# Patient Record
Sex: MALE | NOT HISPANIC OR LATINO | Employment: FULL TIME | URBAN - METROPOLITAN AREA
[De-identification: names, ages, dates, MRNs, and addresses within clinical notes are randomized per-mention and may not be internally consistent; named-entity substitution may affect disease eponyms.]

---

## 2018-05-09 ENCOUNTER — OFFICE VISIT (OUTPATIENT)
Dept: ORTHOPEDIC SURGERY | Facility: CLINIC | Age: 53
End: 2018-05-09

## 2018-05-09 VITALS — BODY MASS INDEX: 34.86 KG/M2 | WEIGHT: 230 LBS | HEIGHT: 68 IN

## 2018-05-09 DIAGNOSIS — M17.12 PRIMARY OSTEOARTHRITIS OF LEFT KNEE: ICD-10-CM

## 2018-05-09 DIAGNOSIS — M25.562 CHRONIC PAIN OF LEFT KNEE: Primary | ICD-10-CM

## 2018-05-09 DIAGNOSIS — G89.29 CHRONIC PAIN OF LEFT KNEE: Primary | ICD-10-CM

## 2018-05-09 PROCEDURE — 73560 X-RAY EXAM OF KNEE 1 OR 2: CPT | Performed by: ORTHOPAEDIC SURGERY

## 2018-05-09 PROCEDURE — 99203 OFFICE O/P NEW LOW 30 MIN: CPT | Performed by: ORTHOPAEDIC SURGERY

## 2018-05-09 RX ORDER — INDOMETHACIN 50 MG/1
CAPSULE ORAL
COMMUNITY
Start: 2018-04-10

## 2018-05-09 RX ORDER — MELOXICAM 15 MG/1
15 TABLET ORAL NIGHTLY PRN
Qty: 90 TABLET | Refills: 0 | Status: SHIPPED | OUTPATIENT
Start: 2018-05-09

## 2018-05-09 RX ORDER — ALLOPURINOL 100 MG/1
TABLET ORAL
COMMUNITY
Start: 2018-04-09

## 2018-05-09 NOTE — PROGRESS NOTES
Chief Complaint   Patient presents with   • Left Knee - Establish Care             HPI  The patient is here today for left knee pain and discomfort. Patient is here for second opinion.  He is had 3 prior knee arthroscopies on this left knee.  He states that the duration of complaints is about 4 years.  He describes his pain as intermittently severe.  There is a burning sensation on the medial aspect of the knee.  He also has a sense of grinding of the medial side of the knee.  He has difficulty going up and down the steps.  There is difficulty in squatting on the ground.  Symptoms are worse when he is walking briskly or running.  He finds it very difficult to squat on the ground because of a sense of tightness in the knee.  He works as a  in a [employer] facility at "[employer].  There is no instability of the knee as such.  I think most of his symptoms are coming from being very active and developing full-thickness articular cartilage loss on the medial femoral condyle.  I do not think more arthroscopic surgery is going to help this gentleman with his symptoms.  I do feel that he is getting ready for a partial or total knee arthroplasty in the near future.        No Known Allergies      Social History     Social History   • Marital status:      Spouse name: N/A   • Number of children: N/A   • Years of education: N/A     Occupational History   • Not on file.     Social History Main Topics   • Smoking status: Never Smoker   • Smokeless tobacco: Never Used   • Alcohol use Yes   • Drug use: Unknown   • Sexual activity: Not on file     Other Topics Concern   • Not on file     Social History Narrative   • No narrative on file       No family history on file.    No past surgical history on file.    No past medical history on file.        There were no vitals filed for this visit.          Review of Systems   Constitutional: Negative.    HENT: Negative.    Eyes: Negative.    Respiratory: Negative.     Cardiovascular: Negative.    Gastrointestinal: Negative.    Endocrine: Negative.    Genitourinary: Negative.    Musculoskeletal: Positive for gait problem and joint swelling.   Skin: Negative.    Allergic/Immunologic: Negative.    Hematological: Negative.    Psychiatric/Behavioral: Negative.            Physical Exam   Constitutional: He is oriented to person, place, and time. He appears well-nourished.   HENT:   Head: Atraumatic.   Eyes: EOM are normal.   Neck: Neck supple.   Cardiovascular: Normal rate, regular rhythm, normal heart sounds and intact distal pulses.    Pulmonary/Chest: Effort normal and breath sounds normal.   Abdominal: Bowel sounds are normal.   Musculoskeletal: He exhibits edema and tenderness.   Neurological: He is alert and oriented to person, place, and time.   Skin: Skin is dry. Capillary refill takes 2 to 3 seconds.   Psychiatric: He has a normal mood and affect. His behavior is normal. Judgment and thought content normal.   Nursing note and vitals reviewed.              Joint/Body Part Specific Exam:Left knee:  The knee joint shows effusion with some thickening of the synovial membrane. There is tenderness over the meniscus. Apley’s grinding test is positive over the joint line. Ruth’s sign is positive with increased pain on torsional testing. There is a distinct click in mid flexion. Range of motion is from 0-110 degrees of flexion. No instability on medial or lateral testing. Anterior and posterior drawer testing is negative. Lachman test is negative. Joint line tenderness is present to direct palpation. There is some tenderness over the medial face of the tibia just distal to the joint line. The dorsalis pedis and posterior tibial artery pulses are palpable. Common peroneal nerve function is well preserved. Gait is cautious and somewhat antalgic. Full extension causes the patient to have quite a bit of pain and discomfort.  Previously healed arthroscopic portals over the anterior  aspect of the knee are noted.            X-RAY Report:    left Knee X-Ray  Indication: pain and limited ROM  AP, Lateral views  Findings: Slight narrowing of the medial joint space  no bony lesion  Soft tissues within normal limits  decreased joint spaces  Hardware appropriately positioned not applicable      no prior studies available for comparison.    X-RAY was ordered and reviewed by Arun Land MD        Diagnostics:  MRI reports from [facility] show a decreased size of the medial meniscus following a partial medial meniscectomy.  There is no evidence of an acute ligamentous injury.  There is marginal osteophyte formation with extensive full-thickness articular cartilage loss along the weightbearing surface of the medial compartment.  There is also reactive bone marrow edema on the medial femoral condyle and the medial tibial plateau.  A small popliteal cyst is noted.  Is a degenerative tear of the medial meniscus as well.  These MRI images are discussed with the patient and are the bases of my recommendations to proceed with Synvisc Visco supplementation.          Damien was seen today for establish care.    Diagnoses and all orders for this visit:    Chronic pain of left knee  -     XR Knee 1 or 2 View Left  -     Visco Treatment; Future    Primary osteoarthritis of left knee  -     Visco Treatment; Future    Other orders  -     SCANNED - IMAGING  -     meloxicam (MOBIC) 15 MG tablet; Take 1 tablet by mouth At Night As Needed for Mild Pain  or Moderate Pain .            Procedures          I provided this patient with educational materials regarding bone health and cast or splint care.        Plan:   Use a supportive brace on the knee to prevent it from buckling and giving out.    Tablet Mobic 15 mg tab 1 by mouth daily at bedtime for pain swelling and discomfort.    Intra-articular injection of steroid and Synvisc Visco supplementation discussed with the patient in great detail.    We will  touch base with his insurance company to see if they will allow us to use Synvisc at his next visit.    Home-based exercise program with stretching and strengthening of the quads and the hamstrings.    At this point I do not think that another arthroscopy will help this patient because he's already had 3 surgical interventions with arthroscopy.  If his symptoms do not respond to Synvisc then he would be a candidate for either a partial or total knee replacement.            CC To Felipe Mir MD

## 2018-05-30 PROBLEM — G89.29 CHRONIC PAIN OF LEFT KNEE: Status: ACTIVE | Noted: 2018-05-30

## 2018-05-30 PROBLEM — M17.12 PRIMARY OSTEOARTHRITIS OF LEFT KNEE: Status: ACTIVE | Noted: 2018-05-30

## 2018-05-30 PROBLEM — M25.562 CHRONIC PAIN OF LEFT KNEE: Status: ACTIVE | Noted: 2018-05-30

## 2018-06-07 ENCOUNTER — OFFICE VISIT (OUTPATIENT)
Dept: ORTHOPEDIC SURGERY | Facility: CLINIC | Age: 53
End: 2018-06-07

## 2018-06-07 VITALS — BODY MASS INDEX: 34.86 KG/M2 | HEIGHT: 68 IN | WEIGHT: 230 LBS

## 2018-06-07 DIAGNOSIS — M17.12 PRIMARY OSTEOARTHRITIS OF LEFT KNEE: Primary | ICD-10-CM

## 2018-06-07 PROCEDURE — 20610 DRAIN/INJ JOINT/BURSA W/O US: CPT | Performed by: ORTHOPAEDIC SURGERY

## 2018-06-07 PROCEDURE — 99213 OFFICE O/P EST LOW 20 MIN: CPT | Performed by: ORTHOPAEDIC SURGERY

## 2018-06-07 RX ORDER — LIDOCAINE HYDROCHLORIDE 10 MG/ML
2 INJECTION, SOLUTION INFILTRATION; PERINEURAL
Status: COMPLETED | OUTPATIENT
Start: 2018-06-07 | End: 2018-06-07

## 2018-06-07 RX ADMIN — LIDOCAINE HYDROCHLORIDE 2 ML: 10 INJECTION, SOLUTION INFILTRATION; PERINEURAL at 14:33

## 2018-06-07 NOTE — PROGRESS NOTES
Damien Birmingham  ?  1965  ?  Chief Complaint   Patient presents with   • Left Knee - Follow-up     ?  HPIthe patient is here today for left knee follow up. Patient continues to have pain and discomfort on the medial aspect of the knee.  There is difficulty in going up and down the steps.  Ross body activities bother the patient significantly.  Occasionally, the knee will buckle and give out from underneath him.  He has difficulty in squatting on the ground as well.  He does have fairly significant arthritis of the knee which is associated with recurrent effusion.  When the patient has a significant effusion, he finds it very difficult to squat on the ground.    Physical Exam   Constitutional: He is oriented to person, place, and time. He appears well-nourished.   HENT:   Head: Atraumatic.   Eyes: EOM are normal.   Neck: Neck supple.   Cardiovascular: Normal heart sounds and intact distal pulses.    Pulmonary/Chest: Breath sounds normal.   Abdominal: Bowel sounds are normal.   Musculoskeletal: He exhibits edema and tenderness.   Neurological: He is alert and oriented to person, place, and time.   Skin: Skin is dry. Capillary refill takes 2 to 3 seconds.   Psychiatric: He has a normal mood and affect. His behavior is normal. Judgment and thought content normal.   Nursing note and vitals reviewed.      Review of Systems   Constitutional: Negative.    HENT: Negative.    Eyes: Negative.    Respiratory: Negative.    Cardiovascular: Negative.    Gastrointestinal: Negative.    Endocrine: Negative.    Genitourinary: Negative.    Musculoskeletal: Positive for joint swelling.   Skin: Negative.    Allergic/Immunologic: Negative.    Neurological: Negative.    Hematological: Negative.    Psychiatric/Behavioral: Negative.        Joint/Body Part Specific Exam:   left knee. Patient has crepitus throughout range of motion. Positive patellar grind test. Mild effusion. Lachman is negative. Pivot shift is negative. Anterior and  posterior drawer signs are negative. Significant joint line tenderness is noted on the medial aspect of the knee. Patient has a varus orientation of the knee. There is fullness and tenderness in the Popliteal fossa. Mild distention of a Popliteal cyst is noted in this location. Range of motion in flexion is from 0- 120° degrees. Neurovascular status is intact.  Dorsalis pedis and posterior tibial artery pulses are palpable. Common peroneal nerve function is well preserved. Patient's gait is cautious and antalgic. Skin and soft tissues are mildly swollen, consistent with synovitis and effusion. The patient has a significant limp with the first few steps after starting the gait cycle. Getting out of a chair takes a lot of effort due to pain on knee flexion.  X-Ray Report:    Diagnostics:    Large Joint Arthrocentesis  Date/Time: 6/7/2018 2:33 PM  Consent given by: patient  Site marked: site marked  Timeout: Immediately prior to procedure a time out was called to verify the correct patient, procedure, equipment, support staff and site/side marked as required   Supporting Documentation  Indications: pain   Procedure Details  Location: knee - L knee  Preparation: Patient was prepped and draped in the usual sterile fashion  Needle size: 25 G  Approach: anteromedial  Medications administered: 48 mg hylan 48 MG/6ML; 2 mL lidocaine 1 %  Patient tolerance: patient tolerated the procedure well with no immediate complications        ?  ?  Plan      · Ice pack for 48 hrs     · Injected patientsLeft knee joint with Visco supplementation with Synvisc 1    · Ace wrap for swelling PRN    · Elevation for swelling PRN    · Tablet Ibuprofen 600 mg P.O. BID x 5 Days with meals    · Call office for any problems  With procedure    · Home Physical Therapy Program discussed with patient    · F/U in 6 months for Re-evaluation or  ·   · Use a supportive brace on the knee to prevent it from buckling and giving out.  ·   · Eventually, he will  most likely need a knee replacement surgery but I would like to delay that and postpone that for as long as possible

## 2019-06-14 ENCOUNTER — OFFICE VISIT (OUTPATIENT)
Dept: ORTHOPEDIC SURGERY | Facility: CLINIC | Age: 54
End: 2019-06-14

## 2019-06-14 VITALS — TEMPERATURE: 97.2 F | BODY MASS INDEX: 37.74 KG/M2 | WEIGHT: 249 LBS | HEIGHT: 68 IN

## 2019-06-14 DIAGNOSIS — M17.0 OSTEOARTHRITIS OF BOTH KNEES, UNSPECIFIED OSTEOARTHRITIS TYPE: Primary | ICD-10-CM

## 2019-06-14 PROCEDURE — 73562 X-RAY EXAM OF KNEE 3: CPT | Performed by: PHYSICIAN ASSISTANT

## 2019-06-14 PROCEDURE — 20610 DRAIN/INJ JOINT/BURSA W/O US: CPT | Performed by: PHYSICIAN ASSISTANT

## 2019-06-14 PROCEDURE — 99215 OFFICE O/P EST HI 40 MIN: CPT | Performed by: PHYSICIAN ASSISTANT

## 2019-06-14 RX ORDER — FUROSEMIDE 40 MG/1
40 TABLET ORAL DAILY
COMMUNITY
Start: 2019-04-25

## 2019-06-14 RX ADMIN — LIDOCAINE HYDROCHLORIDE 2 ML: 10 INJECTION, SOLUTION EPIDURAL; INFILTRATION; INTRACAUDAL; PERINEURAL at 09:51

## 2019-06-14 RX ADMIN — METHYLPREDNISOLONE ACETATE 160 MG: 80 INJECTION, SUSPENSION INTRA-ARTICULAR; INTRALESIONAL; INTRAMUSCULAR; SOFT TISSUE at 09:51

## 2019-06-14 NOTE — PROGRESS NOTES
"FOLLOW UP VISIT    Patient: Damien Birmingham  ?  YOB: 1965    MRN: [id number]  ?  Chief Complaint   Patient presents with   • Left Knee - Follow-up   • Right Knee - Follow-up      ?  HPI:   54 year old male presents today for follow up on bilateral knee pain. He has been seeing Dr. Land for over a year for this complaint. His left knee is most definitely more symptomatic than his right. He last received a steroid injection into the left knee in 6/2018 but only received relief for a short time. He works at [employer], a [employer]. He reports being unable to go up and down the stairs at work and states his management has even mentioned moving his office/desk so that he does not have to use the stairs as often. He also reports pain with walking and standing for prolonged periods, as well as the knee giving out from under him at times. The pain in the left knee is constant. He states \"I haven't had extension in a long time.\" He would like to proceed with scheduling a TKA for the left knee. He also presents for right knee pain that is intermittent and described as an ache. He is currently on indomethacin for Gout and it does seem to improve his right knee symptoms.       This patient is an established patient.  This problem is new to this examiner.      Allergies: No Known Allergies    Medications:   Home Medications:  Current Outpatient Medications on File Prior to Visit   Medication Sig   • allopurinol (ZYLOPRIM) 100 MG tablet    • furosemide (LASIX) 40 MG tablet Take 40 mg by mouth Daily.   • indomethacin (INDOCIN) 50 MG capsule    • meloxicam (MOBIC) 15 MG tablet Take 1 tablet by mouth At Night As Needed for Mild Pain  or Moderate Pain .     No current facility-administered medications on file prior to visit.      Current Medications:  Scheduled Meds:  PRN Meds:.    I have reviewed the patient's medical history in detail and updated the computerized patient record.  Review and summarization of " "old records include:    History reviewed. No pertinent past medical history.  No past surgical history on file.  Social History     Occupational History   • Not on file   Tobacco Use   • Smoking status: Never Smoker   • Smokeless tobacco: Never Used   Substance and Sexual Activity   • Alcohol use: Yes   • Drug use: Defer   • Sexual activity: Not on file      Social History     Social History Narrative   • Not on file     History reviewed. No pertinent family history.      Review of Systems   Constitutional: Negative.  Negative for fever.   Eyes: Negative.    Respiratory: Negative.    Cardiovascular: Negative.    Endocrine: Negative.    Musculoskeletal: Positive for arthralgias, gait problem and joint swelling.   Skin: Negative.  Negative for rash and wound.   Allergic/Immunologic: Negative.    Neurological: Negative for numbness.   Hematological: Negative.    Psychiatric/Behavioral: Negative.           Wt Readings from Last 3 Encounters:   06/14/19 113 kg (249 lb)   06/07/18 104 kg (230 lb)   05/09/18 104 kg (230 lb)     Ht Readings from Last 3 Encounters:   06/14/19 172.7 cm (68\")   06/07/18 172.7 cm (68\")   05/09/18 172.7 cm (68\")     Body mass index is 37.86 kg/m².  Facility age limit for growth percentiles is 20 years.  Vitals:    06/14/19 0846   Temp: 97.2 °F (36.2 °C)         Physical Exam   Constitutional: Patient is oriented to person, place, and time. Appears well-developed and well-nourished.   HENT:   Head: Normocephalic and atraumatic.   Eyes: Conjunctivae and EOM are normal. Pupils are equal, round, and reactive to light.   Cardiovascular: Normal rate, regular rhythm, normal heart sounds and intact distal pulses.   Pulmonary/Chest: Effort normal and breath sounds normal.   Musculoskeletal:   See detailed exam below   Neurological: Alert and oriented to person, place, and time. No sensory deficit. Coordination normal.   Skin: Skin is warm and dry. Capillary refill takes less than 2 seconds. No rash " noted. No erythema.   Psychiatric: Patient has a normal mood and affect. Her behavior is normal. Judgment and thought content normal.   Nursing note and vitals reviewed.    Ortho Exam:   Right knee (varus). Patient has crepitus throughout range of motion. Positive patellar grind test. Mild effusion. Lachman is negative. Pivot shift is negative. Anterior and posterior drawer signs are negative. Mild joint line tenderness is noted on the medial aspect of the knee. Patient has a varus orientation of the knee. There is no fullness or tenderness in the Popliteal fossa. Range of motion in flexion is from 0-120 degrees. Neurovascular status is intact.  Dorsalis pedis and posterior tibial artery pulses are palpable. Common peroneal nerve function is well preserved. Patient's gait is cautious and antalgic. Skin and soft tissues are mildly swollen, consistent with synovitis and effusion. The patient has a significant limp with the first few steps after starting the gait cycle.      Left knee (varus). Patient has crepitus throughout range of motion. Positive patellar grind test. Mild effusion. Lachman is negative. Pivot shift is negative. Anterior and posterior drawer signs are negative. Significant joint line tenderness is noted on the medial aspect of the knee. Patient has a varus orientation of the knee. There is fullness and tenderness in the Popliteal fossa. Mild distention of a Popliteal cyst is noted in this location. Range of motion in flexion is from  degrees. Neurovascular status is intact.  Dorsalis pedis and posterior tibial artery pulses are palpable. Common peroneal nerve function is well preserved. Patient's gait is cautious and antalgic. Skin and soft tissues are mildly swollen, consistent with synovitis and effusion. The patient has a significant limp with the first few steps after starting the gait cycle. Getting out of a chair takes a lot of effort due to pain on knee flexion.       Diagnostics:  X-Ray  Report:  Bilateral knee(s) X-Ray  Indication: Bilateral knee pain   AP, Lateral views  Findings: Mild medial JSN of right knee and mild patellofemoral joint space findings; left knee: advanced arthritis changes most pronounced at the medial aspect of the joint as well as the patellofemoral joint space.  Bony lesion: no  Soft tissues: increased  Joint spaces: decreased  Hardware appropriately positioned: not applicable  Prior studies available for comparison: yes from 2018 of left     X-RAY was ordered and reviewed by Ernst Hoffman PA-C     This patient's x-ray report was graded according to the Kellgren and Olvin classification.  This took into account the joint space narrowing, osteophyte formation, sclerosis of the distal femur/proximal tibia along with deformity of those bones.  The findings were indicative of K L grade 4.       Assessment:  Damien was seen today for follow-up and follow-up.    Diagnoses and all orders for this visit:    Primary osteoarthritis of knees, bilateral  -     XR Knee 3 View Bilateral  -     Large Joint Arthrocentesis: R knee  -     Ambulatory Referral to Physical Therapy Evaluate and treat          Large Joint Arthrocentesis: R knee  Date/Time: 6/14/2019 9:51 AM  Consent given by: patient  Site marked: site marked  Timeout: Immediately prior to procedure a time out was called to verify the correct patient, procedure, equipment, support staff and site/side marked as required   Supporting Documentation  Indications: pain   Procedure Details  Location: knee - R knee  Preparation: Patient was prepped and draped in the usual sterile fashion  Needle size: 25 G  Approach: anteromedial  Medications administered: 160 mg methylPREDNISolone acetate 80 MG/ML; 2 mL lidocaine PF 1% 1 %  Patient tolerance: patient tolerated the procedure well with no immediate complications          Plan    · Discussion of treatment options including bracing, physical therapy, oral medication,  intra-articular steroid injections, Visco supplementation, further imaging and surgical intervention. Patient would like to proceed with a steroid injection into the right knee and with surgical intervention for the left knee if the form of a TKA.   · Injected patient's right knee joint(s)with steroid  from a(n) anteromedial approach.  Patient tolerated the procedure well and no complications were encountered.  · Compression/brace  · Rest, ice, compression, and elevation (RICE) therapy  · Stretching and strengthening exercises  · OTC Alternate Ibuprofen and Tylenol as needed   · Dr. Land also discussed plan to proceed with TKA (left)  The patient was seen today for preoperative discussion.  The patient has been tried on over-the-counter and prescription NSAID's despite the risks of anti-inflammatory bleeding, peptic ulcers and erosive gastritis with short term benefit only.  Braces have been prescribed for mechanical support.  Patient has been participating in an exercise program specifically targeting joint pain relief with limited benefit. Intraarticular injections have been used periodically with some but not complete relief of pain.  Ambulation aids have also been utilized.    · The details of the surgical procedure were explained including the location of probable incisions and a description of the likely hardware/grafts to be used. The patient understands the likely convalescence after surgery as well as the rehabilitation required.  Also, we have thoroughly discussed with the patient the risks, benefits and alternatives to surgery.  Risks include but are not limited to the risk of infection, joint stiffness, limited range of motion, wound healing problems, scar tissue build up, myocardial infarction, stroke, blood clots (including DVT and/or pulmonary embolus along with the risk of death) neurologic and/or vascular injury, limb length discrepancy, fracture, dislocation, nonunion, malunion, continued pain and  need for further surgery including hardware failure requiring revision.     Ernst Hoffmna PA-C  06/14/2019

## 2019-06-16 PROBLEM — M17.0 PRIMARY OSTEOARTHRITIS OF KNEES, BILATERAL: Status: ACTIVE | Noted: 2019-06-16

## 2019-06-16 RX ORDER — METHYLPREDNISOLONE ACETATE 80 MG/ML
160 INJECTION, SUSPENSION INTRA-ARTICULAR; INTRALESIONAL; INTRAMUSCULAR; SOFT TISSUE
Status: COMPLETED | OUTPATIENT
Start: 2019-06-14 | End: 2019-06-14

## 2019-06-16 RX ORDER — LIDOCAINE HYDROCHLORIDE 10 MG/ML
2 INJECTION, SOLUTION EPIDURAL; INFILTRATION; INTRACAUDAL; PERINEURAL
Status: COMPLETED | OUTPATIENT
Start: 2019-06-14 | End: 2019-06-14

## 2019-06-24 ENCOUNTER — HOSPITAL ENCOUNTER (OUTPATIENT)
Dept: PHYSICAL THERAPY | Facility: CLINIC | Age: 54
Setting detail: RECURRING SERIES
Discharge: HOME | End: 2019-07-31
Attending: ORTHOPAEDIC SURGERY

## 2019-07-29 ENCOUNTER — OUTSIDE FACILITY SERVICE (OUTPATIENT)
Dept: ORTHOPEDIC SURGERY | Facility: CLINIC | Age: 54
End: 2019-07-29

## 2019-07-29 PROCEDURE — 27447 TOTAL KNEE ARTHROPLASTY: CPT | Performed by: ORTHOPAEDIC SURGERY

## 2019-07-29 PROCEDURE — 20985 CPTR-ASST DIR MS PX: CPT | Performed by: ORTHOPAEDIC SURGERY

## 2019-07-31 ENCOUNTER — TELEPHONE (OUTPATIENT)
Dept: ORTHOPEDIC SURGERY | Facility: CLINIC | Age: 54
End: 2019-07-31

## 2019-07-31 DIAGNOSIS — Z96.652 S/P TOTAL KNEE REPLACEMENT, LEFT: Primary | ICD-10-CM

## 2019-07-31 NOTE — TELEPHONE ENCOUNTER
PT STOPPED IN OFFICE ASKING WHEN HE CAN RE START THE FOLLOWING MEDICINES:    FUROSEMIDE 40 MG  INDOMETHACIN 50 MCG    PT HAD KNEE REPLACEMENT ON Monday 7/29/19    PLEASE ADVISE

## 2019-07-31 NOTE — TELEPHONE ENCOUNTER
Patient called back and stated he checked his discharge papers from the hospital and he may continue taking those medications. Please disregard previous message.

## 2019-08-05 RX ORDER — OXYCODONE HYDROCHLORIDE AND ACETAMINOPHEN 5; 325 MG/1; MG/1
TABLET ORAL
Qty: 40 TABLET | Refills: 0 | Status: SHIPPED | OUTPATIENT
Start: 2019-08-05 | End: 2019-08-28

## 2019-08-05 NOTE — TELEPHONE ENCOUNTER
Patient states he has not had a bowel movement since Tuesday, he has used Miralax and docusate sodium. I advised the patient to try prune juice and green leafy vegetables.     Waiting for Dr. Land to sign RX

## 2019-08-05 NOTE — TELEPHONE ENCOUNTER
Correct advice on the constipation.  I have signed off on his Percocet and sent it in electronically.  By the way please let him know that 1 of the reasons for constipation is also the strong narcotic medication that he is using to control his pain.  Thank you

## 2019-08-05 NOTE — TELEPHONE ENCOUNTER
Pt requesting refill on his pain medication given at discharge following surgery    Also patient has not had a bowel movement since surgery.     Pt had TKA on 7/29/19.    Please advise

## 2019-08-09 ENCOUNTER — OFFICE VISIT (OUTPATIENT)
Dept: ORTHOPEDIC SURGERY | Facility: CLINIC | Age: 54
End: 2019-08-09

## 2019-08-09 DIAGNOSIS — Z96.652 S/P TOTAL KNEE REPLACEMENT, LEFT: Primary | ICD-10-CM

## 2019-08-09 PROCEDURE — 73560 X-RAY EXAM OF KNEE 1 OR 2: CPT | Performed by: ORTHOPAEDIC SURGERY

## 2019-08-09 PROCEDURE — 99024 POSTOP FOLLOW-UP VISIT: CPT | Performed by: ORTHOPAEDIC SURGERY

## 2019-08-09 NOTE — PROGRESS NOTES
"POST OP TOTAL GLOBAL      NAME: Damien Birmingham           : 1965    MRN: [id number]    Chief Complaint   Patient presents with   • Left Knee - Follow-up       Date of Surgery: 2019 when he had a left total knee arthroplasty.  ?  HPI:   Patient returns today for 10 day(s) follow up of left total knee arthroplasty. Incision(s) healing nicely/as expected with no signs of infection. Patient reports doing well with no unusual complaints. No fevers, rigors or chills. Appears to be progressing appropriately. Patient is on appropriate anticoagulation.  He states that he is doing extremely well.  \"Walking is beautiful on my new knee.  Thank you very much for this gift \".      Review of Systems:      Ortho Exam:   Left knee.The patient is status post total knee arthroplasty postoperative 10 day(s). Incision is clean. Calf is soft and nontender. Homans sign is negative. There is no clicking, popping or catching. Anterior and posterior drawer signs are negative.  There is no instability of the components. Appropriate amounts of swelling and bruising are noted. Dorsalis pedis and posterior tibial artery pulses are palpable. Common peroneal nerve function is well preserved. Range of motion is from 5-95 degrees of flexion. Gait is cautious but otherwise fairly normal. There is no evidence of a deep seated joint infection.      Diagnostic Studies:  xrays obtained today  left Knee X-Ray  Indication: Evaluation of implant position after total knee arthroplasty.  AP, Lateral views  Findings: Well-placed implants with a good cement mantle without any subsidence of the implants.  no bony lesion  Soft tissues within normal limits  within normal limits joint spaces  Hardware appropriately positioned yes      yes prior studies available for comparison.      X-RAY was ordered and reviewed by Arun Land MD      Assessment:  Damien was seen today for follow-up.    Diagnoses and all orders for this visit:    S/P total knee " replacement, left  -     XR Knee 1 or 2 View Left            Plan   · Incision care  · To use ACE wrap/RAFAT stocking  · Continue ice to joint   · Stretching and strengthening exercises of the quads and the hamstrings.  · Aggressive ROM following the total knee arthroplasty protocols with the physical therapy department.  · Given the patient a refill a prescription of Percocet 5/325 mg tab 1 p.o. every 6 as needed pain and discomfort total 30 pills no refills.  · Controlled substance treatment options discussed in detail. Patient's signed consent to medical options on file. STEFANIE form in chart.  The patient is being provided this narcotic prescription to address the acute medical condition that they are undergoing/experiencing at this time.  It is my medical and surgical assessment that more than 3 days of narcotic medication is necessary to help control the pain and discomfort that this patient is experiencing at this point.  Risks of narcotic medication usage outlined.  Possibility of physical and psychological dependence and abuse, especially long term, emphasized to the patient.  I have explained to the patient that we will try to wean them off the narcotic medication as soon as possible and introduce non-narcotic modalities of pain control.  At this point in the patient's clinical spectrum, however, alternative available treatments are inadequate to control their pain and symptoms.  I have also discussed the possibility of random drug testing as well as pill counts to prevent misuse and misappropriation of the narcotic medications prescribed to the patient.  · Falls precautions  · Once Xarelto is completed, change to an enteric coated Aspirin 325 mg daily until 6 weeks following your surgery  · Continue with lifelong antibiotic prophylaxis with dental procedures following total joint replacement.  · Follow up in 6 week(s)    Date of encounter: 08/09/2019   Arun Land MD

## 2019-08-12 ENCOUNTER — TELEPHONE (OUTPATIENT)
Dept: ORTHOPEDIC SURGERY | Facility: CLINIC | Age: 54
End: 2019-08-12

## 2019-08-12 RX ORDER — OXYCODONE HYDROCHLORIDE AND ACETAMINOPHEN 5; 325 MG/1; MG/1
1 TABLET ORAL EVERY 6 HOURS PRN
Qty: 40 TABLET | Refills: 0 | Status: SHIPPED | OUTPATIENT
Start: 2019-08-12 | End: 2019-08-28

## 2019-08-12 NOTE — TELEPHONE ENCOUNTER
PER [facility] PERCOCET 5/325MG ONE TABLET EVERY 6 HOURS WAS SENT TO PHARMACY. THE PATIENT WAS NOTIFIED/RJ

## 2019-08-28 RX ORDER — OXYCODONE HYDROCHLORIDE AND ACETAMINOPHEN 5; 325 MG/1; MG/1
1 TABLET ORAL EVERY 12 HOURS PRN
Qty: 40 TABLET | Refills: 0 | Status: SHIPPED | OUTPATIENT
Start: 2019-08-28

## 2019-08-28 NOTE — TELEPHONE ENCOUNTER
It is okay to refill the patient's Percocet 5/325 mg tab 1 p.o. every 12 as needed pain total 40 pills no refills.  Please send it to me electronically and I will be glad to sign off on it.  Thank you

## 2019-09-19 ENCOUNTER — OFFICE VISIT (OUTPATIENT)
Dept: ORTHOPEDIC SURGERY | Facility: CLINIC | Age: 54
End: 2019-09-19

## 2019-09-19 VITALS — TEMPERATURE: 97.3 F | BODY MASS INDEX: 34.86 KG/M2 | HEIGHT: 68 IN | WEIGHT: 230 LBS

## 2019-09-19 DIAGNOSIS — Z96.652 S/P TOTAL KNEE REPLACEMENT, LEFT: Primary | ICD-10-CM

## 2019-09-19 PROCEDURE — 99024 POSTOP FOLLOW-UP VISIT: CPT | Performed by: ORTHOPAEDIC SURGERY

## 2019-09-19 NOTE — PROGRESS NOTES
"POST OP TOTAL GLOBAL      NAME: Damien Birmingham           : 1965    MRN: [id number]    Chief Complaint   Patient presents with   • Left Knee - Post-op       Date of Surgery: 19  ?  HPI:   Patient returns today for 7 week(s) follow up of left total knee arthroplasty. Incision(s) healed nicely with no signs of infection. Patient reports doing well with no unusual complaints. No fevers, rigors or chills. Appears to be progressing appropriately. Patient is on appropriate anticoagulation.  The patient states that he has done extremely well with physical therapy.  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  This swelling has led to somewhat of a limp and slowed down his postoperative progress.  As far as the knee replacement is concerned he states \"I am very pleased with my surgical outcome \".      Review of Systems:      Ortho Exam:   Left knee.The patient is status post total knee arthroplasty postoperative 7 week(s). Incision is clean. Calf is soft and nontender. Homans sign is negative. There is no clicking, popping or catching. Anterior and posterior drawer signs are negative.  There is no instability of the components. Appropriate amounts of swelling and bruising are noted. Dorsalis pedis and posterior tibial artery pulses are palpable. Common peroneal nerve function is well preserved. Range of motion is from 0-125 degrees of flexion. Gait is cautious but otherwise fairly normal. There is no evidence of a deep seated joint infection.      Diagnostic Studies:  no diagnostic testing performed this visit        Assessment:  Damien was seen today for post-op.    Diagnoses and all orders for this visit:    S/P total knee replacement, left            Plan   · Incision care  · To use ACE wrap/RAFAT stocking  · Continue ice to joint   · Stretching and strengthening exercises  · Aggressive ROM  · Falls precautions  · Once Xarelto is completed, change to an enteric coated Aspirin 325 mg daily " until 6 weeks following your surgery  · Continue with lifelong antibiotic prophylaxis with dental procedures following total joint replacement.  · The patient has a physically demanding job at [employer] and he walks 10 to 12 miles a day.  We have discussed about the wear and tear following artificial knees which have been replaced at surgery and how to promote longevity of the implant.  · Discussed with the patient about return to work status and currently he is off work and is continuing to rehab his knee.  He will let me know when he is ready to proceed with return to work and we will issue his statement in the next 3 to 4 weeks.  · Follow up in 1 year(s)    Date of encounter: 09/19/2019   Arun Land MD

## 2019-10-14 ENCOUNTER — TELEPHONE (OUTPATIENT)
Dept: ORTHOPEDIC SURGERY | Facility: CLINIC | Age: 54
End: 2019-10-14

## 2019-10-14 NOTE — TELEPHONE ENCOUNTER
Patient would like a work note to be released to work on 10/21/19 I have put this in the drawer for him to

## 2020-09-16 DIAGNOSIS — Z96.652 S/P TOTAL KNEE REPLACEMENT, LEFT: Primary | ICD-10-CM
